# Patient Record
Sex: FEMALE | Race: WHITE | NOT HISPANIC OR LATINO | Employment: UNEMPLOYED | URBAN - METROPOLITAN AREA
[De-identification: names, ages, dates, MRNs, and addresses within clinical notes are randomized per-mention and may not be internally consistent; named-entity substitution may affect disease eponyms.]

---
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
## 2023-01-03 ENCOUNTER — OFFICE VISIT (OUTPATIENT)
Dept: INTERNAL MEDICINE | Facility: CLINIC | Age: 18
End: 2023-01-03
Payer: OTHER GOVERNMENT

## 2023-01-03 VITALS
HEIGHT: 64 IN | OXYGEN SATURATION: 98 % | BODY MASS INDEX: 27.31 KG/M2 | DIASTOLIC BLOOD PRESSURE: 72 MMHG | WEIGHT: 160 LBS | TEMPERATURE: 97.3 F | HEART RATE: 97 BPM | SYSTOLIC BLOOD PRESSURE: 120 MMHG

## 2023-01-03 DIAGNOSIS — J30.9 ALLERGIC RHINITIS, UNSPECIFIED SEASONALITY, UNSPECIFIED TRIGGER: ICD-10-CM

## 2023-01-03 DIAGNOSIS — F90.8 ATTENTION DEFICIT HYPERACTIVITY DISORDER (ADHD), OTHER TYPE: ICD-10-CM

## 2023-01-03 DIAGNOSIS — F90.8 ATTENTION DEFICIT HYPERACTIVITY DISORDER (ADHD), OTHER TYPE: Primary | ICD-10-CM

## 2023-01-03 PROCEDURE — 99213 OFFICE O/P EST LOW 20 MIN: CPT | Performed by: NURSE PRACTITIONER

## 2023-01-03 RX ORDER — CETIRIZINE HYDROCHLORIDE 10 MG/1
10 TABLET ORAL DAILY
Qty: 90 TABLET | Refills: 1 | Status: SHIPPED | OUTPATIENT
Start: 2023-01-03

## 2023-01-03 NOTE — PROGRESS NOTES
Chief Complaint  ADHD    Subjective         Reema Croft presents to [facility]  ADHD-  Well controlled with Concerta. Has been working at [employer] seasonally so took medication over break. States she has been working to save money and plans to find a new job. Patient reports grades are all As and Bs. Eating and sleeping well. She has no concerns today.     Allergic rhinitis-  Well controlled with Zyrtec. Requesting refill.        Objective     Vitals:    01/03/23 0815   BP: 120/72   BP Location: Left arm   Patient Position: Sitting   Cuff Size: Adult   Pulse: 97   Temp: 97.3 °F (36.3 °C)   SpO2: 98%   Weight: 72.6 kg (160 lb)   Height: 162.6 cm (64\")      Body mass index is 27.46 kg/m².    Wt Readings from Last 3 Encounters:   01/03/23 72.6 kg (160 lb) (88 %, Z= 1.18)*   09/14/22 78.5 kg (173 lb) (93 %, Z= 1.51)*   08/01/22 78.3 kg (172 lb 9.9 oz) (93 %, Z= 1.51)*     * Growth percentiles are based on CDC (Girls, 2-20 Years) data.     BP Readings from Last 3 Encounters:   01/03/23 120/72   09/14/22 118/63   08/01/22 135/80                Physical Exam  Constitutional:       Appearance: Normal appearance.   HENT:      Head: Normocephalic and atraumatic.      Nose: Nose normal.      Mouth/Throat:      Mouth: Mucous membranes are moist.      Pharynx: Oropharynx is clear.   Eyes:      Extraocular Movements: Extraocular movements intact.      Conjunctiva/sclera: Conjunctivae normal.      Pupils: Pupils are equal, round, and reactive to light.   Cardiovascular:      Rate and Rhythm: Normal rate and regular rhythm.      Heart sounds: Normal heart sounds.   Pulmonary:      Effort: Pulmonary effort is normal.      Breath sounds: Normal breath sounds.   Skin:     General: Skin is warm and dry.   Neurological:      General: No focal deficit present.      Mental Status: She is alert and oriented to person, place, and time.   Psychiatric:         Mood and Affect: Mood normal.          Behavior: Behavior normal.         Thought Content: Thought content normal.          Result Review :   The following data was reviewed by: RONALD Valente on 01/03/2023:      Procedures    Assessment and Plan   Diagnoses and all orders for this visit:    1. Attention deficit hyperactivity disorder (ADHD), other type (Primary)  Assessment & Plan:  Well controlled, continue Concerta. Repeat UDS at follow up visit in three months, sooner if concerns arise.      2. Allergic rhinitis, unspecified seasonality, unspecified trigger  Assessment & Plan:  Well controlled, continue Zyrtec.       Other orders  -     cetirizine (zyrTEC) 10 MG tablet; Take 1 tablet by mouth Daily.  Dispense: 90 tablet; Refill: 1  -     triamcinolone (KENALOG) 0.1 % ointment; Apply  topically to the appropriate area as directed 2 (Two) Times a Day.  Dispense: 30 g; Refill: 0        Follow Up   Return in about 3 months (around 4/3/2023).  Patient was given instructions and counseling regarding her condition or for health maintenance advice. Please see specific information pulled into the AVS if appropriate.

## 2023-01-03 NOTE — ASSESSMENT & PLAN NOTE
Well controlled, continue Concerta. Repeat UDS at follow up visit in three months, sooner if concerns arise.

## 2023-01-03 NOTE — TELEPHONE ENCOUNTER
Last office visit: 1/3/23    Last refill date:12/12/22    Last UDS:6/21/22    Last signed consent:9/14/22

## 2023-01-04 RX ORDER — METHYLPHENIDATE HYDROCHLORIDE 54 MG/1
54 TABLET ORAL DAILY
Qty: 30 TABLET | Refills: 0 | Status: SHIPPED | OUTPATIENT
Start: 2023-01-04 | End: 2023-02-07 | Stop reason: SDUPTHER

## 2023-01-04 NOTE — TELEPHONE ENCOUNTER
Prescription printed. She will need to complete UDS when prescription is picked up or prior to next refill.   Please call to let them know.

## 2023-01-11 ENCOUNTER — CLINICAL SUPPORT (OUTPATIENT)
Dept: INTERNAL MEDICINE | Facility: CLINIC | Age: 18
End: 2023-01-11
Payer: OTHER GOVERNMENT

## 2023-01-11 DIAGNOSIS — F90.9 ATTENTION DEFICIT HYPERACTIVITY DISORDER (ADHD), UNSPECIFIED ADHD TYPE: Primary | ICD-10-CM

## 2023-01-11 PROCEDURE — 80305 DRUG TEST PRSMV DIR OPT OBS: CPT | Performed by: NURSE PRACTITIONER

## 2023-02-07 DIAGNOSIS — F90.8 ATTENTION DEFICIT HYPERACTIVITY DISORDER (ADHD), OTHER TYPE: ICD-10-CM

## 2023-02-07 NOTE — TELEPHONE ENCOUNTER
Caller: NICOLESHAHRIAR    Relationship: Emergency Contact    Best call back number:[phone]    Requested Prescriptions:   Requested Prescriptions     Pending Prescriptions Disp Refills   • methylphenidate (Concerta) 54 MG CR tablet 30 tablet 0     Sig: Take 1 tablet by mouth Daily        Pharmacy where request should be sent: [facility] [place] [facility] [place] [place]  [address] [facility] - [phone]  - [phone] FX     Additional details provided by patient: SHAHRIAR STATES SHE NEED A WRITTEN PRESCRIPTION.    Does the patient have less than a 3 day supply:  [] Yes  [x] No    Would you like a call back once the refill request has been completed: [x] Yes [] No    If the office needs to give you a call back, can they leave a voicemail: [x] Yes [] No    Yani Espitia Rep   02/07/23 11:35 EST

## 2023-02-07 NOTE — TELEPHONE ENCOUNTER
Last follow up visit date: 1/3/2023    Last urine drug screen date: 1/3/2023    Last consent/contract date:9/14/22    Last fill date, if filled at [facility]--(must call [phone] option 3) to obtain: 1/4/2023

## 2023-02-13 NOTE — TELEPHONE ENCOUNTER
Pts guardian was wondering when she can  Pts Concerta RX. Please call when it is ready for pickup. Thank you

## 2023-02-16 RX ORDER — METHYLPHENIDATE HYDROCHLORIDE 54 MG/1
54 TABLET ORAL DAILY
Qty: 30 TABLET | Refills: 0 | Status: SHIPPED | OUTPATIENT
Start: 2023-02-16 | End: 2023-03-20 | Stop reason: SDUPTHER

## 2023-03-20 DIAGNOSIS — F90.8 ATTENTION DEFICIT HYPERACTIVITY DISORDER (ADHD), OTHER TYPE: ICD-10-CM

## 2023-03-20 NOTE — TELEPHONE ENCOUNTER
Caller: SHAHRIAR RIVERA    Relationship: Emergency Contact    Best call back number: [phone]    Requested Prescriptions:   Requested Prescriptions     Pending Prescriptions Disp Refills   • methylphenidate (Concerta) 54 MG CR tablet 30 tablet 0     Sig: Take 1 tablet by mouth Daily        Pharmacy where request should be sent:  GRANDMOTHER WILL PICKUP A WRITTEN SCRIPT     Additional details provided by patient: PLEASE CALL WHEN WRITTEN SCRIPT IS READY TO BE PICKED UP .     Does the patient have less than a 3 day supply:  [] Yes  [x] No    Would you like a call back once the refill request has been completed: [x] Yes [] No    If the office needs to give you a call back, can they leave a voicemail: [x] Yes [] No    Yani Garrido Rep   03/20/23 14:02 EDT

## 2023-03-21 RX ORDER — METHYLPHENIDATE HYDROCHLORIDE 54 MG/1
54 TABLET ORAL DAILY
Qty: 30 TABLET | Refills: 0 | Status: SHIPPED | OUTPATIENT
Start: 2023-03-21

## 2023-03-21 NOTE — TELEPHONE ENCOUNTER
can you send this in please? Called mom to let her know you had filled pt brothers, mom states she needs this one printed and signed as well.

## 2023-04-25 DIAGNOSIS — F90.8 ATTENTION DEFICIT HYPERACTIVITY DISORDER (ADHD), OTHER TYPE: ICD-10-CM

## 2023-04-25 NOTE — TELEPHONE ENCOUNTER
Caller: SHAHRIAR RIVERA    Relationship: Emergency Contact    Best call back number:    [phone]         Requested Prescriptions:   Requested Prescriptions     Pending Prescriptions Disp Refills   • methylphenidate (Concerta) 54 MG CR tablet 30 tablet 0     Sig: Take 1 tablet by mouth Daily        Pharmacy where request should be sent: [facility] [place] [facility] [place]  [address] [facility] - [phone] [facility] [phone] FX     Last office visit with prescribing clinician: 1/3/2023   Last telemedicine visit with prescribing clinician: 5/26/2023   Next office visit with prescribing clinician: 5/26/2023     Additional details provided by patient: THIS NEEDS TO BE A WRITTEN PRESCRIPTION FOR     Does the patient have less than a 3 day supply:  [x] Yes  [] No    Would you like a call back once the refill request has been completed: [x] Yes [] No    If the office needs to give you a call back, can they leave a voicemail: [x] Yes [] No    Yani Walls Rep   04/25/23 08:55 EDT

## 2023-04-26 RX ORDER — METHYLPHENIDATE HYDROCHLORIDE 54 MG/1
54 TABLET ORAL DAILY
Qty: 30 TABLET | Refills: 0 | Status: SHIPPED | OUTPATIENT
Start: 2023-04-26

## 2023-04-26 NOTE — TELEPHONE ENCOUNTER
Last visit: 1/3/23  Next visit: 5/26/23  Last uds: 1/11/23  Last consent: 9/14/22    Is this ok to print out and get signed for them to  since they have the [facility] pharmacy?

## 2023-04-27 NOTE — TELEPHONE ENCOUNTER
I called and let the guardian know the prescription was ready to be picked up. No other questions or concerns at this time.

## 2023-05-26 ENCOUNTER — OFFICE VISIT (OUTPATIENT)
Dept: INTERNAL MEDICINE | Facility: CLINIC | Age: 18
End: 2023-05-26
Payer: OTHER GOVERNMENT

## 2023-05-26 VITALS
SYSTOLIC BLOOD PRESSURE: 110 MMHG | WEIGHT: 155.6 LBS | DIASTOLIC BLOOD PRESSURE: 80 MMHG | BODY MASS INDEX: 26.71 KG/M2 | OXYGEN SATURATION: 98 % | TEMPERATURE: 97.6 F | HEART RATE: 73 BPM

## 2023-05-26 DIAGNOSIS — Z00.00 ANNUAL PHYSICAL EXAM: Primary | ICD-10-CM

## 2023-05-26 DIAGNOSIS — J30.9 ALLERGIC RHINITIS, UNSPECIFIED SEASONALITY, UNSPECIFIED TRIGGER: ICD-10-CM

## 2023-05-26 DIAGNOSIS — F90.8 ATTENTION DEFICIT HYPERACTIVITY DISORDER (ADHD), OTHER TYPE: ICD-10-CM

## 2023-05-26 PROBLEM — F98.8 ATTENTION DEFICIT DISORDER: Status: ACTIVE | Noted: 2021-09-23

## 2023-05-26 LAB
ALBUMIN SERPL-MCNC: 4.6 G/DL (ref 3.5–5.2)
ALBUMIN/GLOB SERPL: 1.8 G/DL
ALP SERPL-CCNC: 84 U/L (ref 39–117)
ALT SERPL W P-5'-P-CCNC: 14 U/L (ref 1–33)
ANION GAP SERPL CALCULATED.3IONS-SCNC: 9.2 MMOL/L (ref 5–15)
AST SERPL-CCNC: 16 U/L (ref 1–32)
BASOPHILS # BLD AUTO: 0.04 10*3/MM3 (ref 0–0.2)
BASOPHILS NFR BLD AUTO: 0.8 % (ref 0–1.5)
BILIRUB SERPL-MCNC: 0.5 MG/DL (ref 0–1.2)
BUN SERPL-MCNC: 8 MG/DL (ref 6–20)
BUN/CREAT SERPL: 11.4 (ref 7–25)
CALCIUM SPEC-SCNC: 9.4 MG/DL (ref 8.6–10.5)
CHLORIDE SERPL-SCNC: 105 MMOL/L (ref 98–107)
CHOLEST SERPL-MCNC: 120 MG/DL (ref 0–200)
CO2 SERPL-SCNC: 26.8 MMOL/L (ref 22–29)
CREAT SERPL-MCNC: 0.7 MG/DL (ref 0.57–1)
DEPRECATED RDW RBC AUTO: 37.7 FL (ref 37–54)
EGFRCR SERPLBLD CKD-EPI 2021: 128 ML/MIN/1.73
EOSINOPHIL # BLD AUTO: 0.05 10*3/MM3 (ref 0–0.4)
EOSINOPHIL NFR BLD AUTO: 1 % (ref 0.3–6.2)
ERYTHROCYTE [DISTWIDTH] IN BLOOD BY AUTOMATED COUNT: 11.8 % (ref 12.3–15.4)
GLOBULIN UR ELPH-MCNC: 2.6 GM/DL
GLUCOSE SERPL-MCNC: 85 MG/DL (ref 65–99)
HCT VFR BLD AUTO: 40.3 % (ref 34–46.6)
HDLC SERPL-MCNC: 57 MG/DL (ref 40–60)
HGB BLD-MCNC: 13.6 G/DL (ref 12–15.9)
IMM GRANULOCYTES # BLD AUTO: 0.02 10*3/MM3 (ref 0–0.05)
IMM GRANULOCYTES NFR BLD AUTO: 0.4 % (ref 0–0.5)
LDLC SERPL CALC-MCNC: 53 MG/DL (ref 0–100)
LDLC/HDLC SERPL: 0.96 {RATIO}
LYMPHOCYTES # BLD AUTO: 2.04 10*3/MM3 (ref 0.7–3.1)
LYMPHOCYTES NFR BLD AUTO: 40.3 % (ref 19.6–45.3)
MCH RBC QN AUTO: 29.2 PG (ref 26.6–33)
MCHC RBC AUTO-ENTMCNC: 33.7 G/DL (ref 31.5–35.7)
MCV RBC AUTO: 86.7 FL (ref 79–97)
MONOCYTES # BLD AUTO: 0.87 10*3/MM3 (ref 0.1–0.9)
MONOCYTES NFR BLD AUTO: 17.2 % (ref 5–12)
NEUTROPHILS NFR BLD AUTO: 2.04 10*3/MM3 (ref 1.7–7)
NEUTROPHILS NFR BLD AUTO: 40.3 % (ref 42.7–76)
NRBC BLD AUTO-RTO: 0 /100 WBC (ref 0–0.2)
PLATELET # BLD AUTO: 258 10*3/MM3 (ref 140–450)
PMV BLD AUTO: 10.5 FL (ref 6–12)
POTASSIUM SERPL-SCNC: 4.5 MMOL/L (ref 3.5–5.2)
PROT SERPL-MCNC: 7.2 G/DL (ref 6–8.5)
RBC # BLD AUTO: 4.65 10*6/MM3 (ref 3.77–5.28)
SODIUM SERPL-SCNC: 141 MMOL/L (ref 136–145)
TRIGL SERPL-MCNC: 40 MG/DL (ref 0–150)
TSH SERPL DL<=0.05 MIU/L-ACNC: 0.58 UIU/ML (ref 0.27–4.2)
VLDLC SERPL-MCNC: 10 MG/DL (ref 5–40)
WBC NRBC COR # BLD: 5.06 10*3/MM3 (ref 3.4–10.8)

## 2023-05-26 PROCEDURE — 99395 PREV VISIT EST AGE 18-39: CPT | Performed by: NURSE PRACTITIONER

## 2023-05-26 PROCEDURE — 80061 LIPID PANEL: CPT | Performed by: NURSE PRACTITIONER

## 2023-05-26 PROCEDURE — 84443 ASSAY THYROID STIM HORMONE: CPT | Performed by: NURSE PRACTITIONER

## 2023-05-26 PROCEDURE — 85025 COMPLETE CBC W/AUTO DIFF WBC: CPT | Performed by: NURSE PRACTITIONER

## 2023-05-26 PROCEDURE — 80053 COMPREHEN METABOLIC PANEL: CPT | Performed by: NURSE PRACTITIONER

## 2023-05-26 RX ORDER — METHYLPHENIDATE HYDROCHLORIDE 54 MG/1
54 TABLET ORAL DAILY
Qty: 30 TABLET | Refills: 0 | Status: SHIPPED | OUTPATIENT
Start: 2023-05-26

## 2023-05-26 RX ORDER — METHYLPHENIDATE HYDROCHLORIDE 54 MG/1
54 TABLET ORAL DAILY
Qty: 30 TABLET | Refills: 0 | Status: SHIPPED | OUTPATIENT
Start: 2023-05-26 | End: 2023-05-26

## 2023-05-26 NOTE — ASSESSMENT & PLAN NOTE
Basic labs in clinic today. Encouraged routine dental and eye exams. Discussed age appropriate immunizations, screenings. Age appropriate handout provided, including information on nutrition and exercise.

## 2023-05-26 NOTE — TELEPHONE ENCOUNTER
Last follow up visit date:  5/26/23    Last urine drug screen date: 1/11/23    Last consent/contract date:5/26/23    Does patient utilize [facility] pharmacy (yes or no)?    If filled at [facility]--(must call [phone] option 3) to obtain last fill date:

## 2023-05-26 NOTE — PROGRESS NOTES
Chief Complaint  Annual Exam    Subjective         Reema Croft presents to [facility]  Last labs: Never  LMP: 5/2023  PAP: Never  Mammogram: Denies family history of breast cancer  Colonoscopy: Denies family history of colon cancer  COVID19 vaccination: Up to date  Hepatitis A vaccination: Up to date  HPV vaccination: Up to date  Eye exam: 2023, wears corrective lenses  Dental exam: Every 6 monts  Smoking history: Denies     Annual physical exam-  Patient denies significant family history of cardiovascular events, arrhythmias. Denies chest pain, blurry vision, headache, leg swelling, shortness of breath, seizure activity. Patient has no concerns at this time.    ADHD-  States she is doing well on Concerta. Plans to work at a [employer] over the summer. Is considering starting classes in the fall, plans to be a .     Allergic rhinitis-  Well controlled with Zyrtec.           Objective     Vitals:    05/26/23 0805   BP: 110/80   Pulse: 73   Temp: 97.6 °F (36.4 °C)   TempSrc: Temporal   SpO2: 98%   Weight: 70.6 kg (155 lb 9.6 oz)      Body mass index is 26.71 kg/m².    Wt Readings from Last 3 Encounters:   05/26/23 70.6 kg (155 lb 9.6 oz) (85 %, Z= 1.03)*   01/03/23 72.6 kg (160 lb) (88 %, Z= 1.18)*   09/14/22 78.5 kg (173 lb) (93 %, Z= 1.51)*     * Growth percentiles are based on CDC (Girls, 2-20 Years) data.     BP Readings from Last 3 Encounters:   05/26/23 110/80   01/03/23 120/72   09/14/22 118/63                Physical Exam  Constitutional:       Appearance: Normal appearance.   HENT:      Head: Normocephalic and atraumatic.      Right Ear: Tympanic membrane normal.      Left Ear: Tympanic membrane normal.      Nose: Nose normal.      Mouth/Throat:      Mouth: Mucous membranes are moist.      Pharynx: Oropharynx is clear.   Eyes:      Extraocular Movements: Extraocular movements intact.      Conjunctiva/sclera: Conjunctivae normal.       Pupils: Pupils are equal, round, and reactive to light.   Neck:      Thyroid: No thyroid mass, thyromegaly or thyroid tenderness.   Cardiovascular:      Rate and Rhythm: Normal rate and regular rhythm.      Heart sounds: Normal heart sounds.   Pulmonary:      Effort: Pulmonary effort is normal.      Breath sounds: Normal breath sounds.   Abdominal:      General: Bowel sounds are normal.      Palpations: Abdomen is soft.   Skin:     General: Skin is warm and dry.   Neurological:      General: No focal deficit present.      Mental Status: She is alert and oriented to person, place, and time.   Psychiatric:         Mood and Affect: Mood normal.         Behavior: Behavior normal.         Thought Content: Thought content normal.          Result Review :   The following data was reviewed by: RONALD Valente on 05/26/2023:      Procedures    Assessment and Plan   Diagnoses and all orders for this visit:    1. Annual physical exam (Primary)  Assessment & Plan:  Basic labs in clinic today. Encouraged routine dental and eye exams. Discussed age appropriate immunizations, screenings. Age appropriate handout provided, including information on nutrition and exercise.      Orders:  -     Comprehensive Metabolic Panel  -     CBC & Differential  -     TSH  -     Lipid Panel    2. Attention deficit hyperactivity disorder (ADHD), other type  Assessment & Plan:  Well controlled, continue Concerta. Patient due for UDS with next prescription pickup.       3. Allergic rhinitis, unspecified seasonality, unspecified trigger  Assessment & Plan:  Well controlled, continue Zyrtec.           Follow Up   Return in about 3 months (around 8/26/2023).  Patient was given instructions and counseling regarding her condition or for health maintenance advice. Please see specific information pulled into the AVS if appropriate.

## 2023-09-05 ENCOUNTER — OFFICE VISIT (OUTPATIENT)
Dept: INTERNAL MEDICINE | Facility: CLINIC | Age: 18
End: 2023-09-05
Payer: OTHER GOVERNMENT

## 2023-09-05 VITALS
OXYGEN SATURATION: 100 % | TEMPERATURE: 97.3 F | WEIGHT: 163 LBS | HEIGHT: 64 IN | BODY MASS INDEX: 27.83 KG/M2 | SYSTOLIC BLOOD PRESSURE: 118 MMHG | HEART RATE: 70 BPM | DIASTOLIC BLOOD PRESSURE: 72 MMHG

## 2023-09-05 DIAGNOSIS — F90.8 ATTENTION DEFICIT HYPERACTIVITY DISORDER (ADHD), OTHER TYPE: Primary | ICD-10-CM

## 2023-09-05 DIAGNOSIS — J30.9 ALLERGIC RHINITIS, UNSPECIFIED SEASONALITY, UNSPECIFIED TRIGGER: ICD-10-CM

## 2023-09-05 DIAGNOSIS — F90.8 ATTENTION DEFICIT HYPERACTIVITY DISORDER (ADHD), OTHER TYPE: ICD-10-CM

## 2023-09-05 PROCEDURE — 99213 OFFICE O/P EST LOW 20 MIN: CPT | Performed by: NURSE PRACTITIONER

## 2023-09-05 RX ORDER — METHYLPHENIDATE HYDROCHLORIDE 54 MG/1
54 TABLET ORAL DAILY
Qty: 30 TABLET | Refills: 0 | Status: CANCELLED | OUTPATIENT
Start: 2023-09-05

## 2023-09-05 RX ORDER — CETIRIZINE HYDROCHLORIDE 10 MG/1
10 TABLET ORAL DAILY
Qty: 90 TABLET | Refills: 1 | Status: SHIPPED | OUTPATIENT
Start: 2023-09-05

## 2023-09-05 NOTE — PROGRESS NOTES
"Chief Complaint  ADHD and Follow-up (3 month follow up adhd )    Subjective         Reema Croft presents to [facility]  HPI     ADHD-  Doing well with Concerta. 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 She continues to work at [employer], going to school two days a week. Melatonin is working well for sleep. She is due for [facility] today, will return tomorrow [place] in [place] states she peed right before she came today.     Allergic rhinitis-  Well controlled with Zyrtec.       Objective     Vitals:    09/05/23 0806   BP: 118/72   BP Location: Left arm   Patient Position: Sitting   Cuff Size: Adult   Pulse: 70   Temp: 97.3 °F (36.3 °C)   TempSrc: Temporal   SpO2: 100%   Weight: 73.9 kg (163 lb)   Height: 162.6 cm (64.02\")      Body mass index is 27.96 kg/m².    Wt Readings from Last 3 Encounters:   09/05/23 73.9 kg (163 lb) (89 %, Z= 1.21)*   05/26/23 70.6 kg (155 lb 9.6 oz) (85 %, Z= 1.03)*   01/03/23 72.6 kg (160 lb) (88 %, Z= 1.18)*     * Growth percentiles are based on CDC (Girls, 2-20 Years) data.     BP Readings from Last 3 Encounters:   09/05/23 118/72   05/26/23 110/80   01/03/23 120/72                Physical Exam  Constitutional:       Appearance: Normal appearance.   HENT:      Head: Normocephalic and atraumatic.      Nose: Nose normal.      Mouth/Throat:      Mouth: Mucous membranes are moist.      Pharynx: Oropharynx is clear.   Eyes:      Extraocular Movements: Extraocular movements intact.      Conjunctiva/sclera: Conjunctivae normal.      Pupils: Pupils are equal, round, and reactive to light.   Neck:      Thyroid: No thyroid mass, thyromegaly or thyroid tenderness.   Cardiovascular:      Rate and Rhythm: Normal rate and regular rhythm.      Heart sounds: Normal heart sounds.   Pulmonary:      Effort: Pulmonary effort is normal.      Breath sounds: Normal breath sounds.   Skin:     General: Skin is warm and dry.   Neurological:      General: " No focal deficit present.      Mental Status: She is alert and oriented to person, place, and time.   Psychiatric:         Mood and Affect: Mood normal.         Behavior: Behavior normal.         Thought Content: Thought content normal.        Result Review :   The following data was reviewed by: RONALD Valente on 09/05/2023:      Procedures    Assessment and Plan   Diagnoses and all orders for this visit:    1. Attention deficit hyperactivity disorder (ADHD), other type (Primary)  Assessment & Plan:  Well controlled, continue Concerta. Up to date on CSA, she will return tomorrow for UDS. Follow up in three months, sooner if concerns arise.       2. Allergic rhinitis, unspecified seasonality, unspecified trigger  Assessment & Plan:  Well controlled, continue Zyrtec.       Other orders  -     cetirizine (zyrTEC) 10 MG tablet; Take 1 tablet by mouth Daily.  Dispense: 90 tablet; Refill: 1          Follow Up   Return in about 3 months (around 12/5/2023).  Patient was given instructions and counseling regarding her condition or for health maintenance advice. Please see specific information pulled into the AVS if appropriate.

## 2023-09-05 NOTE — ASSESSMENT & PLAN NOTE
Well controlled, continue Concerta. Up to date on CSA, she will return tomorrow for UDS. Follow up in three months, sooner if concerns arise.

## 2023-09-06 ENCOUNTER — CLINICAL SUPPORT (OUTPATIENT)
Dept: INTERNAL MEDICINE | Facility: CLINIC | Age: 18
End: 2023-09-06
Payer: OTHER GOVERNMENT

## 2023-09-06 DIAGNOSIS — F98.8 ATTENTION DEFICIT DISORDER, UNSPECIFIED HYPERACTIVITY PRESENCE: Primary | ICD-10-CM

## 2023-09-06 NOTE — PROGRESS NOTES
Last follow up visit date: 9/05/2023    Last urine drug screen date: 9/06/2023    Last consent/contract date: 6/23/2023    Does patient utilize [facility] pharmacy (yes or no)? YES

## 2023-09-07 RX ORDER — METHYLPHENIDATE HYDROCHLORIDE 54 MG/1
54 TABLET ORAL DAILY
Qty: 30 TABLET | Refills: 0 | Status: SHIPPED | OUTPATIENT
Start: 2023-09-07

## 2023-09-07 RX ORDER — METHYLPHENIDATE HYDROCHLORIDE 54 MG/1
54 TABLET ORAL DAILY
Qty: 30 TABLET | Refills: 0 | Status: SHIPPED | OUTPATIENT
Start: 2023-09-07 | End: 2023-09-07 | Stop reason: SDUPTHER

## 2023-12-19 ENCOUNTER — OFFICE VISIT (OUTPATIENT)
Dept: INTERNAL MEDICINE | Facility: CLINIC | Age: 18
End: 2023-12-19
Payer: OTHER GOVERNMENT

## 2023-12-19 VITALS
OXYGEN SATURATION: 100 % | SYSTOLIC BLOOD PRESSURE: 118 MMHG | WEIGHT: 167.4 LBS | TEMPERATURE: 99 F | DIASTOLIC BLOOD PRESSURE: 78 MMHG | BODY MASS INDEX: 28.72 KG/M2 | HEART RATE: 85 BPM

## 2023-12-19 DIAGNOSIS — J30.9 ALLERGIC RHINITIS, UNSPECIFIED SEASONALITY, UNSPECIFIED TRIGGER: ICD-10-CM

## 2023-12-19 DIAGNOSIS — F98.8 ATTENTION DEFICIT DISORDER, UNSPECIFIED HYPERACTIVITY PRESENCE: Primary | ICD-10-CM

## 2023-12-19 NOTE — PROGRESS NOTES
Chief Complaint  ADHD (3 mo. 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 Grades have been good. Sleeping well. UDS: 9/2023. CSA: 6/2023    Allergic rhinitis-  Well controlled with Zyrtec.     Objective     Vitals:    12/19/23 1254   BP: 118/78   BP Location: Left arm   Patient Position: Sitting   Cuff Size: Adult   Pulse: 85   Temp: 99 °F (37.2 °C)   TempSrc: Temporal   SpO2: 100%   Weight: 75.9 kg (167 lb 6.4 oz)      Body mass index is 28.72 kg/m².    Wt Readings from Last 3 Encounters:   12/19/23 75.9 kg (167 lb 6.4 oz)   09/05/23 73.9 kg (163 lb) (89%, Z= 1.21)*   05/26/23 70.6 kg (155 lb 9.6 oz) (85%, Z= 1.03)*     * Growth percentiles are based on CDC (Girls, 2-20 Years) data.     BP Readings from Last 3 Encounters:   12/19/23 118/78   09/05/23 118/72   05/26/23 110/80                  Physical Exam  Constitutional:       Appearance: Normal appearance.   HENT:      Head: Normocephalic and atraumatic.      Nose: Nose normal.      Mouth/Throat:      Mouth: Mucous membranes are moist.      Pharynx: Oropharynx is clear.   Eyes:      Extraocular Movements: Extraocular movements intact.      Conjunctiva/sclera: Conjunctivae normal.      Pupils: Pupils are equal, round, and reactive to light.   Cardiovascular:      Rate and Rhythm: Normal rate and regular rhythm.      Heart sounds: Normal heart sounds.   Pulmonary:      Effort: Pulmonary effort is normal.      Breath sounds: Normal breath sounds.   Skin:     General: Skin is warm and dry.   Neurological:      General: No focal deficit present.      Mental Status: She is alert and oriented to person, place, and time.   Psychiatric:         Mood and Affect: Mood normal.         Behavior: Behavior normal.         Thought Content: Thought content normal.          Result Review :   The following data was reviewed by:  RONALD Valente on 12/19/2023:      Procedures    Assessment and Plan   Diagnoses and all orders for this visit:    1. Attention deficit disorder, unspecified hyperactivity presence (Primary)  Assessment & Plan:  Well controlled, continue Concerta. Up to date on UDS and CSA. Follow up in three months, sooner if concerns arise.      2. Allergic rhinitis, unspecified seasonality, unspecified trigger  Assessment & Plan:  Well controlled, continue Zyrtec.      Other orders  -     Fluzone >6 Months (9909-3149)          Follow Up   Return in about 3 months (around 3/19/2024).  Patient was given instructions and counseling regarding her condition or for health maintenance advice. Please see specific information pulled into the AVS if appropriate.

## 2023-12-19 NOTE — ASSESSMENT & PLAN NOTE
Well controlled, continue Concerta. Up to date on UDS and CSA. Follow up in three months, sooner if concerns arise.

## 2024-01-09 DIAGNOSIS — F90.8 ATTENTION DEFICIT HYPERACTIVITY DISORDER (ADHD), OTHER TYPE: ICD-10-CM

## 2024-01-09 NOTE — TELEPHONE ENCOUNTER
Refill Request:      1.  Last Office Visit:  12/19/2023     2.  Next Office Visit: Visit date not found     3.  Medication Requested:Concerta 54 mg QD    4. Last fill date: 9/7/23   Quantity: 30    5.  Pharmacy:   [facility] - [place], [place] - [address] [phone]  - [phone]   [address] [address]  Phone: [phone] Fax: [phone]      6. UDS 9/6/23    7. NC 6/23/23    8. Does patient utilize [facility] pharmacy (yes or no)?yes

## 2024-01-09 NOTE — TELEPHONE ENCOUNTER
Caller: SHAHRIAR RIVERA    Relationship: Emergency Contact    Best call back number: [phone]    Requested Prescriptions:   Requested Prescriptions     Pending Prescriptions Disp Refills    methylphenidate (Concerta) 54 MG CR tablet 30 tablet 0     Sig: Take 1 tablet by mouth Daily        Pharmacy where request should be sent: [facility] - [address] [phone] [facility] [phone]      Last office visit with prescribing clinician: 12/19/2023   Last telemedicine visit with prescribing clinician: Visit date not found   Next office visit with prescribing clinician: Visit date not found     Additional details provided by patient: PATIENT IS NEEDING REFILLS SENT TO [facility]. SHE IS OUT OF REFILLS CURRENTLY WITH PHARMACY.     Does the patient have less than a 3 day supply:  [x] Yes  [] No    Would you like a call back once the refill request has been completed: [] Yes [x] No    If the office needs to give you a call back, can they leave a voicemail: [] Yes [x] No    Yani Lundy Rep   01/09/24 14:48 EST

## 2024-01-10 RX ORDER — METHYLPHENIDATE HYDROCHLORIDE 54 MG/1
54 TABLET ORAL DAILY
Qty: 30 TABLET | Refills: 0 | Status: SHIPPED | OUTPATIENT
Start: 2024-01-10

## 2024-03-19 ENCOUNTER — OFFICE VISIT (OUTPATIENT)
Dept: INTERNAL MEDICINE | Facility: CLINIC | Age: 19
End: 2024-03-19
Payer: OTHER GOVERNMENT

## 2024-03-19 VITALS
BODY MASS INDEX: 28.54 KG/M2 | DIASTOLIC BLOOD PRESSURE: 72 MMHG | WEIGHT: 167.2 LBS | TEMPERATURE: 97.7 F | HEART RATE: 109 BPM | SYSTOLIC BLOOD PRESSURE: 128 MMHG | HEIGHT: 64 IN | OXYGEN SATURATION: 100 %

## 2024-03-19 DIAGNOSIS — Z79.899 LONG TERM CURRENT USE OF THERAPEUTIC DRUG: ICD-10-CM

## 2024-03-19 DIAGNOSIS — F98.8 ATTENTION DEFICIT DISORDER, UNSPECIFIED HYPERACTIVITY PRESENCE: Primary | ICD-10-CM

## 2024-03-19 DIAGNOSIS — J30.9 ALLERGIC RHINITIS, UNSPECIFIED SEASONALITY, UNSPECIFIED TRIGGER: ICD-10-CM

## 2024-03-19 LAB
AMPHET+METHAMPHET UR QL: NEGATIVE
AMPHETAMINE INTERNAL CONTROL: ABNORMAL
AMPHETAMINES UR QL: NEGATIVE
BARBITURATE INTERNAL CONTROL: ABNORMAL
BARBITURATES UR QL SCN: NEGATIVE
BENZODIAZ UR QL SCN: NEGATIVE
BENZODIAZEPINE INTERNAL CONTROL: ABNORMAL
BUPRENORPHINE INTERNAL CONTROL: ABNORMAL
BUPRENORPHINE SERPL-MCNC: NEGATIVE NG/ML
CANNABINOIDS SERPL QL: NEGATIVE
COCAINE INTERNAL CONTROL: ABNORMAL
COCAINE UR QL: NEGATIVE
EXPIRATION DATE: ABNORMAL
Lab: ABNORMAL
MDMA (ECSTASY) INTERNAL CONTROL: ABNORMAL
MDMA UR QL SCN: NEGATIVE
METHADONE INTERNAL CONTROL: ABNORMAL
METHADONE UR QL SCN: NEGATIVE
METHAMPHETAMINE INTERNAL CONTROL: ABNORMAL
OPIATES INTERNAL CONTROL: ABNORMAL
OPIATES UR QL: POSITIVE
OXYCODONE INTERNAL CONTROL: ABNORMAL
OXYCODONE UR QL SCN: NEGATIVE
PCP UR QL SCN: NEGATIVE
PHENCYCLIDINE INTERNAL CONTROL: ABNORMAL
THC INTERNAL CONTROL: ABNORMAL

## 2024-03-19 PROCEDURE — 80305 DRUG TEST PRSMV DIR OPT OBS: CPT | Performed by: NURSE PRACTITIONER

## 2024-03-19 PROCEDURE — 99213 OFFICE O/P EST LOW 20 MIN: CPT | Performed by: NURSE PRACTITIONER

## 2024-03-19 RX ORDER — CETIRIZINE HYDROCHLORIDE 10 MG/1
10 TABLET ORAL DAILY
Qty: 90 TABLET | Refills: 1 | Status: SHIPPED | OUTPATIENT
Start: 2024-03-19

## 2024-03-19 NOTE — PROGRESS NOTES
"Chief Complaint  Med Refill (Med refill ) and ADD (3 month f/u)    Subjective      Reema Croft is a 18 y.o. female who presents to [facility] MEDIC[facility] PED[facility]ATRICS     ADD-  Doing well on Concerta. Grades have been good, As and Bs in school. She continues to work at [employer] and is doing well at work. States she takes Concerta typically only on her school days. Is needing a refill today. Due for repeat UDS. Patient is recovering from wisdom tooth removal, returns to work today.     Allergic rhinitis-  Well controlled with Zyrtec.     Objective   Vital Signs:   Vitals:    03/19/24 0939   BP: 128/72   BP Location: Left arm   Patient Position: Sitting   Cuff Size: Large Adult   Pulse: 109   Temp: 97.7 °F (36.5 °C)   TempSrc: Temporal   SpO2: 100%   Weight: 75.8 kg (167 lb 3.2 oz)   Height: 162.6 cm (64.02\")     Body mass index is 28.69 kg/m².    Wt Readings from Last 3 Encounters:   03/19/24 75.8 kg (167 lb 3.2 oz)   12/19/23 75.9 kg (167 lb 6.4 oz)   09/05/23 73.9 kg (163 lb) (89%, Z= 1.21)*     * Growth percentiles are based on CDC (Girls, 2-20 Years) data.     BP Readings from Last 3 Encounters:   03/19/24 128/72   12/19/23 118/78   09/05/23 118/72       Health Maintenance   Topic Date Due    COVID-19 Vaccine (2 - 2023-24 season) 09/01/2023    HEPATITIS C SCREENING  05/26/2024 (Originally 6/22/2021)    ANNUAL PHYSICAL  05/26/2024    BMI FOLLOWUP  05/26/2024    TDAP/TD VACCINES (4 - Td or Tdap) 08/01/2032    INFLUENZA VACCINE  Completed    MENINGOCOCCAL VACCINE  Completed    HPV VACCINES  Completed    Pneumococcal Vaccine 0-64  Aged Out       Physical Exam  Constitutional:       Appearance: Normal appearance.   HENT:      Head: Normocephalic and atraumatic.      Nose: Nose normal.      Mouth/Throat:      Mouth: Mucous membranes are moist.      Pharynx: Oropharynx is clear.   Eyes:      Extraocular Movements: Extraocular movements intact.      Conjunctiva/sclera: Conjunctivae normal.      " Pupils: Pupils are equal, round, and reactive to light.   Cardiovascular:      Rate and Rhythm: Normal rate and regular rhythm.      Heart sounds: Normal heart sounds.   Pulmonary:      Effort: Pulmonary effort is normal.      Breath sounds: Normal breath sounds.   Skin:     General: Skin is warm and dry.   Neurological:      General: No focal deficit present.      Mental Status: She is alert and oriented to person, place, and time.   Psychiatric:         Mood and Affect: Mood normal.         Behavior: Behavior normal.         Thought Content: Thought content normal.          Result Review :  The following data was reviewed by: RONALD Valente on 03/19/2024:         Procedures          Assessment & Plan  Attention deficit disorder, unspecified hyperactivity presence  UDS positive for opiates. Attempted to call patient to discuss further, this is likely from recent wisdom tooth surgery, per DUSTY [place] sent 3/4/2024. Will confirm last dosage before discussing refill of Concerta with supervising physician. All previous urine drug screens have been without concern. Will follow up in three months if prescription is continued, sooner if concerns arise.  Long term current use of therapeutic drug    Allergic rhinitis, unspecified seasonality, unspecified trigger  Well controlled, continue Zyrtec.     Orders Placed This Encounter   Procedures    POC Urine Drug Screen [facility]     New Medications Ordered This Visit   Medications    cetirizine (zyrTEC) 10 MG tablet     Sig: Take 1 tablet by mouth Daily.     Dispense:  90 tablet     Refill:  1         FOLLOW UP  Return in about 3 months (around 6/19/2024).  Patient was given instructions and counseling regarding her condition or for health maintenance advice. Please see specific information pulled into the AVS if appropriate.       RONALD Valente  03/19/24  12:56 EDT    CURRENT & DISCONTINUED MEDICATIONS  Current Outpatient Medications   Medication  Instructions    cetirizine (ZYRTEC) 10 mg, Oral, Daily    melatonin 1 mg, Oral, Nightly    methylphenidate (CONCERTA) 54 mg, Oral, Daily       Medications Discontinued During This Encounter   Medication Reason    triamcinolone (KENALOG) 0.1 % ointment     cetirizine (zyrTEC) 10 MG tablet Reorder

## 2024-03-19 NOTE — ASSESSMENT & PLAN NOTE
UDS positive for opiates. Attempted to call patient to discuss further, this is likely from recent wisdom tooth surgery, per DUSTY [place] sent 3/4/2024. Will confirm last dosage before discussing refill of Concerta with supervising physician. All previous urine drug screens have been without concern. Will follow up in three months if prescription is continued, sooner if concerns arise.

## 2024-03-27 DIAGNOSIS — F90.8 ATTENTION DEFICIT HYPERACTIVITY DISORDER (ADHD), OTHER TYPE: ICD-10-CM

## 2024-03-27 NOTE — TELEPHONE ENCOUNTER
Patient came into the office and stated that her meds were not at the pharmacy as of today 03-27-24. Would like a call back when sent.

## 2024-03-28 NOTE — TELEPHONE ENCOUNTER
Last follow up visit date: 3/19/24    Last urine drug screen date: 3/19/24    Last consent/contract date: 6/23/23    Does patient utilize [facility] pharmacy (yes or no)?    [facility]

## 2024-03-29 RX ORDER — CETIRIZINE HYDROCHLORIDE 10 MG/1
10 TABLET ORAL DAILY
Qty: 90 TABLET | Refills: 1 | Status: SHIPPED | OUTPATIENT
Start: 2024-03-29

## 2024-03-29 RX ORDER — METHYLPHENIDATE HYDROCHLORIDE 54 MG/1
54 TABLET ORAL DAILY
Qty: 30 TABLET | Refills: 0 | Status: SHIPPED | OUTPATIENT
Start: 2024-03-29

## 2024-06-19 ENCOUNTER — OFFICE VISIT (OUTPATIENT)
Dept: INTERNAL MEDICINE | Facility: CLINIC | Age: 19
End: 2024-06-19
Payer: OTHER GOVERNMENT

## 2024-06-19 VITALS
OXYGEN SATURATION: 98 % | DIASTOLIC BLOOD PRESSURE: 76 MMHG | SYSTOLIC BLOOD PRESSURE: 110 MMHG | TEMPERATURE: 97.6 F | BODY MASS INDEX: 28.17 KG/M2 | HEART RATE: 65 BPM | HEIGHT: 64 IN | WEIGHT: 165 LBS

## 2024-06-19 DIAGNOSIS — F90.8 ATTENTION DEFICIT HYPERACTIVITY DISORDER (ADHD), OTHER TYPE: ICD-10-CM

## 2024-06-19 DIAGNOSIS — Z00.00 ANNUAL PHYSICAL EXAM: Primary | ICD-10-CM

## 2024-06-19 DIAGNOSIS — Z13.9 SCREENING FOR CONDITION: ICD-10-CM

## 2024-06-19 LAB
ALBUMIN SERPL-MCNC: 5.2 G/DL (ref 3.5–5.2)
ALBUMIN/GLOB SERPL: 1.6 G/DL
ALP SERPL-CCNC: 96 U/L (ref 39–117)
ALT SERPL W P-5'-P-CCNC: 11 U/L (ref 1–33)
AMPHET+METHAMPHET UR QL: NEGATIVE
AMPHETAMINE INTERNAL CONTROL: NORMAL
AMPHETAMINES UR QL: NEGATIVE
ANION GAP SERPL CALCULATED.3IONS-SCNC: 11.7 MMOL/L (ref 5–15)
AST SERPL-CCNC: 12 U/L (ref 1–32)
BARBITURATE INTERNAL CONTROL: NORMAL
BARBITURATES UR QL SCN: NEGATIVE
BASOPHILS # BLD AUTO: 0.03 10*3/MM3 (ref 0–0.2)
BASOPHILS NFR BLD AUTO: 0.4 % (ref 0–1.5)
BENZODIAZ UR QL SCN: NEGATIVE
BENZODIAZEPINE INTERNAL CONTROL: NORMAL
BILIRUB SERPL-MCNC: 1.4 MG/DL (ref 0–1.2)
BUN SERPL-MCNC: 8 MG/DL (ref 6–20)
BUN/CREAT SERPL: 11.4 (ref 7–25)
BUPRENORPHINE INTERNAL CONTROL: NORMAL
BUPRENORPHINE SERPL-MCNC: NEGATIVE NG/ML
CALCIUM SPEC-SCNC: 10 MG/DL (ref 8.6–10.5)
CANNABINOIDS SERPL QL: NEGATIVE
CHLORIDE SERPL-SCNC: 103 MMOL/L (ref 98–107)
CHOLEST SERPL-MCNC: 172 MG/DL (ref 0–200)
CO2 SERPL-SCNC: 25.3 MMOL/L (ref 22–29)
COCAINE INTERNAL CONTROL: NORMAL
COCAINE UR QL: NEGATIVE
CREAT SERPL-MCNC: 0.7 MG/DL (ref 0.57–1)
DEPRECATED RDW RBC AUTO: 38.9 FL (ref 37–54)
EGFRCR SERPLBLD CKD-EPI 2021: 127.2 ML/MIN/1.73
EOSINOPHIL # BLD AUTO: 0.05 10*3/MM3 (ref 0–0.4)
EOSINOPHIL NFR BLD AUTO: 0.6 % (ref 0.3–6.2)
ERYTHROCYTE [DISTWIDTH] IN BLOOD BY AUTOMATED COUNT: 12 % (ref 12.3–15.4)
EXPIRATION DATE: NORMAL
GLOBULIN UR ELPH-MCNC: 3.3 GM/DL
GLUCOSE SERPL-MCNC: 90 MG/DL (ref 65–99)
HCT VFR BLD AUTO: 45.1 % (ref 34–46.6)
HCV AB SER QL: NORMAL
HDLC SERPL-MCNC: 63 MG/DL (ref 40–60)
HGB BLD-MCNC: 15 G/DL (ref 12–15.9)
IMM GRANULOCYTES # BLD AUTO: 0.03 10*3/MM3 (ref 0–0.05)
IMM GRANULOCYTES NFR BLD AUTO: 0.4 % (ref 0–0.5)
LDLC SERPL CALC-MCNC: 96 MG/DL (ref 0–100)
LDLC/HDLC SERPL: 1.52 {RATIO}
LYMPHOCYTES # BLD AUTO: 2.44 10*3/MM3 (ref 0.7–3.1)
LYMPHOCYTES NFR BLD AUTO: 29.9 % (ref 19.6–45.3)
Lab: NORMAL
MCH RBC QN AUTO: 29.4 PG (ref 26.6–33)
MCHC RBC AUTO-ENTMCNC: 33.3 G/DL (ref 31.5–35.7)
MCV RBC AUTO: 88.3 FL (ref 79–97)
MDMA (ECSTASY) INTERNAL CONTROL: NORMAL
MDMA UR QL SCN: NEGATIVE
METHADONE INTERNAL CONTROL: NORMAL
METHADONE UR QL SCN: NEGATIVE
METHAMPHETAMINE INTERNAL CONTROL: NORMAL
MONOCYTES # BLD AUTO: 0.48 10*3/MM3 (ref 0.1–0.9)
MONOCYTES NFR BLD AUTO: 5.9 % (ref 5–12)
MORPHINE INTERNAL CONTROL: NORMAL
MORPHINE/OPIATES SCREEN, URINE: NEGATIVE
NEUTROPHILS NFR BLD AUTO: 5.13 10*3/MM3 (ref 1.7–7)
NEUTROPHILS NFR BLD AUTO: 62.8 % (ref 42.7–76)
NRBC BLD AUTO-RTO: 0 /100 WBC (ref 0–0.2)
OXYCODONE INTERNAL CONTROL: NORMAL
OXYCODONE UR QL SCN: NEGATIVE
PCP UR QL SCN: NEGATIVE
PHENCYCLIDINE INTERNAL CONTROL: NORMAL
PLATELET # BLD AUTO: 327 10*3/MM3 (ref 140–450)
PMV BLD AUTO: 10.9 FL (ref 6–12)
POTASSIUM SERPL-SCNC: 3.9 MMOL/L (ref 3.5–5.2)
PROT SERPL-MCNC: 8.5 G/DL (ref 6–8.5)
RBC # BLD AUTO: 5.11 10*6/MM3 (ref 3.77–5.28)
SODIUM SERPL-SCNC: 140 MMOL/L (ref 136–145)
THC INTERNAL CONTROL: NORMAL
TRIGL SERPL-MCNC: 66 MG/DL (ref 0–150)
TSH SERPL DL<=0.05 MIU/L-ACNC: 0.39 UIU/ML (ref 0.27–4.2)
VLDLC SERPL-MCNC: 13 MG/DL (ref 5–40)
WBC NRBC COR # BLD AUTO: 8.16 10*3/MM3 (ref 3.4–10.8)

## 2024-06-19 PROCEDURE — 85025 COMPLETE CBC W/AUTO DIFF WBC: CPT | Performed by: NURSE PRACTITIONER

## 2024-06-19 PROCEDURE — 80053 COMPREHEN METABOLIC PANEL: CPT | Performed by: NURSE PRACTITIONER

## 2024-06-19 PROCEDURE — 80061 LIPID PANEL: CPT | Performed by: NURSE PRACTITIONER

## 2024-06-19 PROCEDURE — 99395 PREV VISIT EST AGE 18-39: CPT | Performed by: NURSE PRACTITIONER

## 2024-06-19 PROCEDURE — 84443 ASSAY THYROID STIM HORMONE: CPT | Performed by: NURSE PRACTITIONER

## 2024-06-19 PROCEDURE — 86803 HEPATITIS C AB TEST: CPT | Performed by: NURSE PRACTITIONER

## 2024-06-19 PROCEDURE — 36415 COLL VENOUS BLD VENIPUNCTURE: CPT | Performed by: NURSE PRACTITIONER

## 2024-06-19 PROCEDURE — 80305 DRUG TEST PRSMV DIR OPT OBS: CPT | Performed by: NURSE PRACTITIONER

## 2024-06-19 RX ORDER — CETIRIZINE HYDROCHLORIDE 10 MG/1
10 TABLET ORAL DAILY
Qty: 90 TABLET | Refills: 1 | Status: SHIPPED | OUTPATIENT
Start: 2024-06-19

## 2024-06-19 RX ORDER — METHYLPHENIDATE HYDROCHLORIDE 54 MG/1
54 TABLET ORAL DAILY
Qty: 30 TABLET | Refills: 0 | Status: SHIPPED | OUTPATIENT
Start: 2024-06-19

## 2024-06-19 RX ORDER — METHYLPHENIDATE HYDROCHLORIDE 54 MG/1
54 TABLET ORAL DAILY
Qty: 30 TABLET | Refills: 0 | Status: CANCELLED | OUTPATIENT
Start: 2024-06-19

## 2024-06-19 NOTE — ASSESSMENT & PLAN NOTE
Basic labs in clinic today. Encouraged routine dental and eye exams. Discussed age appropriate immunizations, screenings. Age appropriate handout provided, including information on nutrition and physical activity. Pap smear due at 19.

## 2024-06-19 NOTE — PROGRESS NOTES
"Chief Complaint  ADD (F/u)    Subjective      Reema Croft is a 18 y.o. female who presents to [facility] MEDIC[facility] PED[facility]ATRICS     Last labs: 5/2023  LMP: 6/2024  PAP: Due at 19  Mammogram: Denies family history of breast cancer  Hepatitis C screening: Due   Colonoscopy: Denies family history of colon cancer  COVID19 vaccination: Declines   Eye exam: Due   Dental exam: Due   Smoking history: Denies     Annual physical exam-  Patient denies significant family history of cardiovascular events, arrhythmias. Denies chest pain, blurry vision, headache, leg swelling, shortness of breath, seizure activity. Patient has no concerns at this time.    ADD-  Patient in clinic for follow up on ADD. Is taking Concerta 2-3 times a week with school. Does not have to take at work or on the weekends. 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 She feels that  things are going well, needs a refill today. Due for repeat UDS, positive opiates 3/2024 after pain medication post wisdom tooth extraction.     Objective   Vital Signs:   Vitals:    06/19/24 1135   BP: 110/76   BP Location: Left arm   Patient Position: Sitting   Cuff Size: Adult   Pulse: 65   Temp: 97.6 °F (36.4 °C)   TempSrc: Temporal   SpO2: 98%   Weight: 74.8 kg (165 lb)   Height: 162.6 cm (64.02\")     Body mass index is 28.31 kg/m².    Wt Readings from Last 3 Encounters:   06/19/24 74.8 kg (165 lb)   03/19/24 75.8 kg (167 lb 3.2 oz)   12/19/23 75.9 kg (167 lb 6.4 oz)     BP Readings from Last 3 Encounters:   06/19/24 110/76   03/19/24 128/72   12/19/23 118/78       Health Maintenance   Topic Date Due    HEPATITIS C SCREENING  Never done    ANNUAL PHYSICAL  05/26/2024    COVID-19 Vaccine (2 - 2023-24 season) 06/21/2024 (Originally 9/1/2023)    INFLUENZA VACCINE  08/01/2024    BMI FOLLOWUP  06/19/2025    TDAP/TD VACCINES (4 - Td or Tdap) 08/01/2032    MENINGOCOCCAL VACCINE  Completed    HPV VACCINES  " Completed    Pneumococcal Vaccine 0-64  Aged Out       Physical Exam  Constitutional:       Appearance: Normal appearance.   HENT:      Head: Normocephalic and atraumatic.      Nose: Nose normal.      Mouth/Throat:      Mouth: Mucous membranes are moist.      Pharynx: Oropharynx is clear.   Eyes:      Extraocular Movements: Extraocular movements intact.      Conjunctiva/sclera: Conjunctivae normal.      Pupils: Pupils are equal, round, and reactive to light.   Neck:      Thyroid: No thyroid mass, thyromegaly or thyroid tenderness.   Cardiovascular:      Rate and Rhythm: Normal rate and regular rhythm.      Heart sounds: Normal heart sounds.   Pulmonary:      Effort: Pulmonary effort is normal.      Breath sounds: Normal breath sounds.   Skin:     General: Skin is warm and dry.   Neurological:      General: No focal deficit present.      Mental Status: She is alert and oriented to person, place, and time.   Psychiatric:         Mood and Affect: Mood normal.         Behavior: Behavior normal.         Thought Content: Thought content normal.          Result Review :  The following data was reviewed by: RONALD Valente on 06/19/2024:         Procedures          Assessment & Plan  Annual physical exam  Basic labs in clinic today. Encouraged routine dental and eye exams. Discussed age appropriate immunizations, screenings. Age appropriate handout provided, including information on nutrition and physical activity. Pap smear due at 19.  Attention deficit hyperactivity disorder (ADHD), other type  Well controlled. Will update UDS today and have supervising physician refill Concerta based on results. Follow up in three months, sooner if concerns arise.  Screening for condition  Routine hepatitis C screening today.     Orders Placed This Encounter   Procedures    Comprehensive Metabolic Panel    TSH    Lipid Panel    Hepatitis C Antibody    CBC Auto Differential    POC 12 Panel Urine Drug Screen    CBC & Differential      New Medications Ordered This Visit   Medications    cetirizine (zyrTEC) 10 MG tablet     Sig: Take 1 tablet by mouth Daily.     Dispense:  90 tablet     Refill:  1       FOLLOW UP  Return in about 3 months (around 9/19/2024).  Patient was given instructions and counseling regarding her condition or for health maintenance advice. Please see specific information pulled into the AVS if appropriate.       RONALD Valente  06/19/24  12:55 EDT    CURRENT & DISCONTINUED MEDICATIONS  Current Outpatient Medications   Medication Instructions    cetirizine (ZYRTEC) 10 mg, Oral, Daily    melatonin 1 mg, Oral, Nightly    methylphenidate (CONCERTA) 54 mg, Oral, Daily       Medications Discontinued During This Encounter   Medication Reason    cetirizine (zyrTEC) 10 MG tablet Reorder

## 2024-06-19 NOTE — ASSESSMENT & PLAN NOTE
Well controlled. Will update UDS today and have supervising physician refill Concerta based on results. Follow up in three months, sooner if concerns arise.

## 2024-09-23 ENCOUNTER — OFFICE VISIT (OUTPATIENT)
Dept: INTERNAL MEDICINE | Facility: CLINIC | Age: 19
End: 2024-09-23
Payer: OTHER GOVERNMENT

## 2024-09-23 VITALS
HEART RATE: 88 BPM | DIASTOLIC BLOOD PRESSURE: 70 MMHG | TEMPERATURE: 98.3 F | SYSTOLIC BLOOD PRESSURE: 122 MMHG | HEIGHT: 64 IN | WEIGHT: 169.2 LBS | OXYGEN SATURATION: 99 % | BODY MASS INDEX: 28.89 KG/M2

## 2024-09-23 DIAGNOSIS — F90.8 ATTENTION DEFICIT HYPERACTIVITY DISORDER (ADHD), OTHER TYPE: Primary | ICD-10-CM

## 2024-09-23 DIAGNOSIS — J30.9 ALLERGIC RHINITIS, UNSPECIFIED SEASONALITY, UNSPECIFIED TRIGGER: ICD-10-CM

## 2024-09-23 PROCEDURE — 90471 IMMUNIZATION ADMIN: CPT | Performed by: NURSE PRACTITIONER

## 2024-09-23 PROCEDURE — 99213 OFFICE O/P EST LOW 20 MIN: CPT | Performed by: NURSE PRACTITIONER

## 2024-09-23 PROCEDURE — 90656 IIV3 VACC NO PRSV 0.5 ML IM: CPT | Performed by: NURSE PRACTITIONER

## 2024-09-23 RX ORDER — CETIRIZINE HYDROCHLORIDE 10 MG/1
10 TABLET ORAL DAILY
Qty: 90 TABLET | Refills: 1 | Status: SHIPPED | OUTPATIENT
Start: 2024-09-23

## 2024-11-18 ENCOUNTER — TELEMEDICINE (OUTPATIENT)
Dept: FAMILY MEDICINE CLINIC | Facility: TELEHEALTH | Age: 19
End: 2024-11-18
Payer: OTHER GOVERNMENT

## 2024-11-18 DIAGNOSIS — J06.9 VIRAL URI: Primary | ICD-10-CM

## 2024-11-18 PROCEDURE — 99213 OFFICE O/P EST LOW 20 MIN: CPT | Performed by: NURSE PRACTITIONER

## 2024-11-18 RX ORDER — BROMPHENIRAMINE MALEATE, PSEUDOEPHEDRINE HYDROCHLORIDE, AND DEXTROMETHORPHAN HYDROBROMIDE 2; 30; 10 MG/5ML; MG/5ML; MG/5ML
5 SYRUP ORAL 4 TIMES DAILY PRN
Qty: 118 ML | Refills: 0 | Status: SHIPPED | OUTPATIENT
Start: 2024-11-18

## 2024-11-18 NOTE — PROGRESS NOTES
Subjective   Reema Croft is a 18 y.o. female.     History of Present Illness  The patient presents for evaluation of sinus problems.    She is experiencing nasal congestion and a stuffy nose, which began yesterday. She reports no fever but does feel sinus pressure. She has a history of seasonal allergies. She denies any ear pain or sore throat. She has a cough but no shortness of breath. She denies any nausea, vomiting, or diarrhea. She experiences intermittent headaches. She has not been in contact with anyone who is ill. She continues to take Zyrtec and occasionally uses nasal sprays.    ALLERGIES  She denies any drug allergies.    The following portions of the patient's history were reviewed and updated as appropriate: allergies, current medications, past family history, past medical history, past social history, past surgical history, and problem list.    Review of Systems   Constitutional:  Negative for fever.   HENT:  Positive for congestion and sinus pressure. Negative for ear pain and sore throat.    Respiratory:  Positive for cough. Negative for shortness of breath.    Gastrointestinal:  Negative for diarrhea, nausea and vomiting.   Musculoskeletal:  Negative for myalgias.   Allergic/Immunologic: Negative for environmental allergies.   Neurological:  Positive for headache.       Objective   Physical Exam  Constitutional:       General: She is not in acute distress.     Appearance: She is well-developed. She is not diaphoretic.   HENT:      Mouth/Throat:      Pharynx: Posterior oropharyngeal erythema present.      Tonsils: 0 on the right. 0 on the left.   Pulmonary:      Effort: Pulmonary effort is normal.   Neurological:      Mental Status: She is alert and oriented to person, place, and time.   Psychiatric:         Behavior: Behavior normal.       Heart rate is 85, no temperature reading available on tyto device    Assessment & Plan   Diagnoses and all orders for this visit:    1. Viral URI (Primary)  -      JOAQUIN FLU + SARS PCR; Future  -     brompheniramine-pseudoephedrine-DM 30-2-10 MG/5ML syrup; Take 5 mL by mouth 4 (Four) Times a Day As Needed for Congestion or Cough.  Dispense: 118 mL; Refill: 0      Symptoms include nasal congestion, sinus pressure, and intermittent headaches. No fever, ear pain, sore throat, nausea, vomiting, diarrhea, or body aches were reported. The patient has a cough but no shortness of breath. A swab test for COVID-19 and influenza was conducted to rule out these infections. Medication for cough and congestion, which also contains an antihistamine, has been prescribed and sent to [facility] in [facility] at [address]. The use of Flonase spray was suggested. If symptoms do not improve or worsen after 7 to 10 days, a follow-up visit is recommended.    Covid and flu are negative today      Patient or patient representative verbalized consent for the use of Ambient Listening during the visit with  RONALD Chase for chart documentation. 11/18/2024  17:45 EST    The use of a video visit has been reviewed with the patient and verbal informed consent has been obtained. Myself and Reema Croft participated in this visit. The patient is located in [place], [place]. I am located in [place], [place]. [employer] and [employer] were utilized. I spent 10 minutes in the patient's chart for this visit.

## 2024-12-18 ENCOUNTER — OFFICE VISIT (OUTPATIENT)
Dept: INTERNAL MEDICINE | Facility: CLINIC | Age: 19
End: 2024-12-18
Payer: COMMERCIAL

## 2024-12-18 VITALS
RESPIRATION RATE: 16 BRPM | OXYGEN SATURATION: 100 % | HEART RATE: 77 BPM | DIASTOLIC BLOOD PRESSURE: 74 MMHG | SYSTOLIC BLOOD PRESSURE: 126 MMHG | TEMPERATURE: 97.8 F | HEIGHT: 64 IN | BODY MASS INDEX: 29.47 KG/M2 | WEIGHT: 172.6 LBS

## 2024-12-18 DIAGNOSIS — Z79.899 LONG TERM CURRENT USE OF THERAPEUTIC DRUG: ICD-10-CM

## 2024-12-18 DIAGNOSIS — F90.8 OTHER SPECIFIED ATTENTION DEFICIT HYPERACTIVITY DISORDER (ADHD): ICD-10-CM

## 2024-12-18 DIAGNOSIS — J30.9 ALLERGIC RHINITIS, UNSPECIFIED SEASONALITY, UNSPECIFIED TRIGGER: ICD-10-CM

## 2024-12-18 DIAGNOSIS — L20.82 FLEXURAL ECZEMA: ICD-10-CM

## 2024-12-18 DIAGNOSIS — F90.9 ATTENTION DEFICIT HYPERACTIVITY DISORDER (ADHD), UNSPECIFIED ADHD TYPE: Primary | ICD-10-CM

## 2024-12-18 LAB
AMPHET+METHAMPHET UR QL: NEGATIVE
AMPHETAMINE INTERNAL CONTROL: NORMAL
AMPHETAMINES UR QL: NEGATIVE
BARBITURATE INTERNAL CONTROL: NORMAL
BARBITURATES UR QL SCN: NEGATIVE
BENZODIAZ UR QL SCN: NEGATIVE
BENZODIAZEPINE INTERNAL CONTROL: NORMAL
BUPRENORPHINE INTERNAL CONTROL: NORMAL
BUPRENORPHINE SERPL-MCNC: NEGATIVE NG/ML
CANNABINOIDS SERPL QL: NEGATIVE
COCAINE INTERNAL CONTROL: NORMAL
COCAINE UR QL: NEGATIVE
EXPIRATION DATE: NORMAL
Lab: NORMAL
MDMA (ECSTASY) INTERNAL CONTROL: NORMAL
MDMA UR QL SCN: NEGATIVE
METHADONE INTERNAL CONTROL: NORMAL
METHADONE UR QL SCN: NEGATIVE
METHAMPHETAMINE INTERNAL CONTROL: NORMAL
MORPHINE INTERNAL CONTROL: NORMAL
MORPHINE/OPIATES SCREEN, URINE: NEGATIVE
OXYCODONE INTERNAL CONTROL: NORMAL
OXYCODONE UR QL SCN: NEGATIVE
PCP UR QL SCN: NEGATIVE
PHENCYCLIDINE INTERNAL CONTROL: NORMAL
THC INTERNAL CONTROL: NORMAL

## 2024-12-18 PROCEDURE — 1159F MED LIST DOCD IN RCRD: CPT | Performed by: NURSE PRACTITIONER

## 2024-12-18 PROCEDURE — 80305 DRUG TEST PRSMV DIR OPT OBS: CPT | Performed by: NURSE PRACTITIONER

## 2024-12-18 PROCEDURE — 1160F RVW MEDS BY RX/DR IN RCRD: CPT | Performed by: NURSE PRACTITIONER

## 2024-12-18 PROCEDURE — 99213 OFFICE O/P EST LOW 20 MIN: CPT | Performed by: NURSE PRACTITIONER

## 2024-12-18 PROCEDURE — 1126F AMNT PAIN NOTED NONE PRSNT: CPT | Performed by: NURSE PRACTITIONER

## 2024-12-18 RX ORDER — TRIAMCINOLONE ACETONIDE 1 MG/G
OINTMENT TOPICAL 2 TIMES DAILY
Qty: 30 G | Refills: 1 | Status: SHIPPED | OUTPATIENT
Start: 2024-12-18

## 2024-12-18 RX ORDER — CETIRIZINE HYDROCHLORIDE 10 MG/1
10 TABLET ORAL DAILY
Qty: 90 TABLET | Refills: 1 | Status: SHIPPED | OUTPATIENT
Start: 2024-12-18

## 2024-12-18 NOTE — ASSESSMENT & PLAN NOTE
Discussed prevention with Eucerin, CeraVe or Cetaphil daily following her shower.  Will send triamcinolone to be used as needed for flares.  Discussed risk of skin thinning and hypopigmentation with long-term use.

## 2024-12-18 NOTE — PROGRESS NOTES
"Chief Complaint  ADHD and Med Refill (Zyrtec, Concerta and eczema cream)    Subjective      Reema Croft is a 19 y.o. female who presents to [facility]     ADHD-  Doing well on Concerta, needing refill today.  Patient only takes these on the day she goes to school.  Current dosage continues to work well.  UDS 6/2024.  CSA 6/2024.    Eczema-  Patient reporting flare in eczema on her arms with the weather changes, has been treating with over-the-counter hydrocortisone but would like refill of triamcinolone today.  She is not currently using any daily preventative care.    Allergic rhinitis-  Well controlled, needs refill of Zyrtec today.    Influenza vaccination: Up to date  COVID-19 vaccination: Declines  Pap smear: Patient to consider scheduling  Objective   Vital Signs:   Vitals:    12/18/24 0933   BP: 126/74   BP Location: Right arm   Patient Position: Sitting   Cuff Size: Adult   Pulse: 77   Resp: 16   Temp: 97.8 °F (36.6 °C)   TempSrc: Temporal   SpO2: 100%   Weight: 78.3 kg (172 lb 9.6 oz)   Height: 162.6 cm (64\")     Body mass index is 29.63 kg/m².    Wt Readings from Last 3 Encounters:   12/18/24 78.3 kg (172 lb 9.6 oz)   09/23/24 76.7 kg (169 lb 3.2 oz)   06/19/24 74.8 kg (165 lb)     BP Readings from Last 3 Encounters:   12/18/24 126/74   09/23/24 122/70   06/19/24 110/76       Health Maintenance   Topic Date Due    PAP SMEAR  Never done    COVID-19 Vaccine (2 - 2024-25 season) 09/01/2024    ANNUAL PHYSICAL  06/19/2025    BMI FOLLOWUP  06/19/2025    TDAP/TD VACCINES (4 - Td or Tdap) 08/01/2032    HEPATITIS C SCREENING  Completed    INFLUENZA VACCINE  Completed    MENINGOCOCCAL VACCINE  Completed    HPV VACCINES  Completed    Pneumococcal Vaccine 0-64  Aged Out       Physical Exam  Constitutional:       Appearance: Normal appearance.   HENT:      Head: Normocephalic and atraumatic.      Nose: Nose normal.      Mouth/Throat:      Mouth: Mucous membranes are " moist.      Pharynx: Oropharynx is clear.   Eyes:      Extraocular Movements: Extraocular movements intact.      Conjunctiva/sclera: Conjunctivae normal.      Pupils: Pupils are equal, round, and reactive to light.   Cardiovascular:      Rate and Rhythm: Normal rate and regular rhythm.      Heart sounds: Normal heart sounds.   Pulmonary:      Effort: Pulmonary effort is normal.      Breath sounds: Normal breath sounds.   Skin:     General: Skin is warm and dry.   Neurological:      General: No focal deficit present.      Mental Status: She is alert and oriented to person, place, and time.   Psychiatric:         Mood and Affect: Mood normal.         Behavior: Behavior normal.         Thought Content: Thought content normal.          Result Review :  The following data was reviewed by: RONALD Valente on 12/18/2024:         Procedures          Assessment & Plan  Attention deficit hyperactivity disorder (ADHD), unspecified ADHD type  Continue Concerta.  Update UDS in clinic today.  CSA 6/2024.  Will send refill request to supervising physician.  Close follow up in 3 months.         Flexural eczema  Discussed prevention with Eucerin, CeraVe or Cetaphil daily following her shower.  Will send triamcinolone to be used as needed for flares.  Discussed risk of skin thinning and hypopigmentation with long-term use.       Long term current use of therapeutic drug    Orders:    POC 12 Panel Urine Drug Screen    Allergic rhinitis, unspecified seasonality, unspecified trigger  Zyrtec to pharmacy.           FOLLOW UP  Return in about 3 months (around 3/18/2025).  Patient was given instructions and counseling regarding her condition or for health maintenance advice. Please see specific information pulled into the AVS if appropriate.       RONALD Valente  12/18/24  13:57 EST    CURRENT & DISCONTINUED MEDICATIONS  Current Outpatient Medications   Medication Instructions    cetirizine (ZYRTEC) 10 mg, Oral, Daily    melatonin  1 mg, Oral, Nightly    methylphenidate (CONCERTA) 54 mg, Oral, Daily    triamcinolone (KENALOG) 0.1 % ointment Topical, 2 Times Daily       Medications Discontinued During This Encounter   Medication Reason    brompheniramine-pseudoephedrine-DM 30-2-10 MG/5ML syrup     cetirizine (zyrTEC) 10 MG tablet Reorder

## 2024-12-18 NOTE — ASSESSMENT & PLAN NOTE
Continue Concerta.  Update UDS in clinic today.  CSA 6/2024.  Will send refill request to supervising physician.  Close follow up in 3 months.

## 2024-12-19 RX ORDER — METHYLPHENIDATE HYDROCHLORIDE 54 MG/1
54 TABLET ORAL DAILY
Qty: 30 TABLET | Refills: 0 | Status: SHIPPED | OUTPATIENT
Start: 2024-12-19

## 2024-12-19 NOTE — TELEPHONE ENCOUNTER
Last follow up visit date: 12/19/24    Last urine drug screen date: n/a    Last consent/contract date:9/25/24    Does patient utilize [facility] pharmacy (yes or no)?